# Patient Record
Sex: FEMALE | Race: WHITE | NOT HISPANIC OR LATINO | Employment: UNEMPLOYED | URBAN - NONMETROPOLITAN AREA
[De-identification: names, ages, dates, MRNs, and addresses within clinical notes are randomized per-mention and may not be internally consistent; named-entity substitution may affect disease eponyms.]

---

## 2023-11-22 ENCOUNTER — TELEPHONE (OUTPATIENT)
Dept: FAMILY MEDICINE CLINIC | Facility: CLINIC | Age: 38
End: 2023-11-22
Payer: MEDICAID

## 2023-11-22 NOTE — TELEPHONE ENCOUNTER
HUB to read description below.      LVM FOR PT TO CALL OFFICE AND RESCHEDULE APPOINTMENT THAT'S ON 11-28-23. PLEASE SCHEDULE NEXT AVAILABLE WITH [facility] PROVIDER HAD A PASSING IN FAMILY. THANK YOU.